# Patient Record
Sex: FEMALE | Race: WHITE | NOT HISPANIC OR LATINO | Employment: UNEMPLOYED | URBAN - METROPOLITAN AREA
[De-identification: names, ages, dates, MRNs, and addresses within clinical notes are randomized per-mention and may not be internally consistent; named-entity substitution may affect disease eponyms.]

---

## 2017-04-28 ENCOUNTER — TELEPHONE (OUTPATIENT)
Dept: CARDIOLOGY | Facility: HOSPITAL | Age: 47
End: 2017-04-28

## 2017-04-28 NOTE — TELEPHONE ENCOUNTER
Contacted patient regarding lab work results from January, and re-sent letter with results.       ----- Message from Deena Palomino sent at 4/27/2017  9:43 AM EDT -----  Contact: patient  Pt states she never got her lab results from December.  I saw a letter was sent to her on [address] but she said she never got it.  Can you please give her a call with those results.  Thank you :)